# Patient Record
(demographics unavailable — no encounter records)

---

## 2025-03-04 NOTE — HISTORY OF PRESENT ILLNESS
[FreeTextEntry1] : 5/16/22: Ms. Brandi George is here today for neurology evaluation. She says that a few months ago she noted episodes of shaking. This only happened when lying in bed on her left side. She states that the shaking was strong enough to shake the bed. She was alert the entire time. She states the shaking lasted less than one minute. There was no residual symptoms. This happened on a few occasions over a two week period. she started to sleep on her right side. Since that time she has slept on her left side again and it has not recurred.   She also reports chronic headaches since the age of four years old. She has been diagnosed with migraines in the past. She has daily headaches and chronic eye pain.  Pain can occur in various locations - sometimes a pressure, sometimes a band around her head.  She has photophobia in general.  She has chronic nausea.  She has had change in her vision over the years but not associated with headaches.   Sleep is disrupted by environmental factors -  watching television.  She has not taken daily medications for headaches. She reports having multiple sensitivities. She had a bad reaction to venlafaxine in the past.  She has tingling in her left arm affecting all of her fingers. She has chronic neck pain.   3/28/24: She reports daily headaches that are holocephalic.  She continues to have pain in the back of her head.  She went to the ED on 3/21/24. The prior night she felt pressure over her temples, left more than right. She also felt tingling over the left side of her face. She had twitching around her left eye. She also noted an indentation/lump on the left side of her head and saw a prominent vein on the left side of her forehead. Her mother had temporal arteritis which caused her to be very concerned.  Her ESR was 28. CT head was unremarkable.  She continues to have some pain and pressure over her temples although it is not as bad. She still has some very slight numbness over the left face.   Sometimes she feels like her head hurts more with exertion.   She reports chronic pins and needles in her fingers and toes.   She does feel anxious all of the time.  She took venlafaxine in her 20s. She says that this triggered an autoimmune reaction.   3/4/25: She has a lot of pain and pressure at the back of her head, base of her skull which makes it difficult to sleep. Pain and pressure is present all of the time but is worse when she lies down. She also has pressure in her eyes.  If she tries to lie on her side her hands go numb. She did not try duloxetine. She was diagnosed with rheumatoid arthritis over the past year. She also says that Raynaud's has been bad. 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 She was also told that she has anti-phospholipid syndrome. Dr. Miranda told her that she has some carotid plaque. She notes some difficulty swallowing solids. She continues to have numbness in her feet. She has not had more shaking spells.

## 2025-03-04 NOTE — PHYSICAL EXAM
[FreeTextEntry1] : Examination: Constitutional: normal, no apparent distress Eyes: normal conjunctiva b/l, no ptosis, visual fields full Respiratory: no respiratory distress, normal effort, normal auscultation Cardiovascular: normal rate, rhythm, no murmurs Neck: supple, no masses Vascular: carotids normal Skin: normal color, no rashes Psych: normal mood, affect  Neurological: Memory: normal memory, oriented to person, place, time Language intact/no aphasia Cranial Nerves: II-XII intact, Pupils equally round and reactive to light, ocular muscles/movements intact, no ptosis, no facial weakness, tongue protrudes normally in the midline,  Motor: normal tone, no pronator drift, full strength in all four extremities in the proximal and distal muscle groups Coordination: Fine motor movements intact, rapid alternating movements intact, finger to nose intact bilaterally Sensory: intact to light touch DTRs: symmetric, hypoactive Gait: using cane

## 2025-03-04 NOTE — CONSULT LETTER
[Dear  ___] : Dear  [unfilled], [Consult Letter:] : I had the pleasure of evaluating your patient, [unfilled]. [Please see my note below.] : Please see my note below. [Consult Closing:] : Thank you very much for allowing me to participate in the care of this patient.  If you have any questions, please do not hesitate to contact me. [FreeTextEntry2] : Vasquez Solis [FreeTextEntry3] : Sincerely,   Lauren Farfan MD Diplomate, [employer] Neu[employer] Board Certified in the Subspecialty of Clinical Neurophysiology Board Certified in the Subspecialty of Sleep Medicine Board Certified in the Subspecialty of Epilepsy

## 2025-03-04 NOTE — DISCUSSION/SUMMARY
[FreeTextEntry1] :     Ms. Brandi George is a 48 year old woman with chronic headaches, episodes of shaking, chronic neck pain and left arm paresthesias.  She recently had a severe headache with numbness/tingling of the left side of her face. -I think this was likely a migraine with paresthesias in the face. -She also has chronic sinus congestion. -Will give samples of Nurtec to try in the event of another migraine. She should not take more than one in a 24 hour period.   Chronic headaches -She has daily headaches which are likely multifactorial. -MRI brain in November 2022 showed borderline Chiari. -She also has tension headaches, fibromyalgia and was recently diagnosed with rheumatoid arthritis -Will repeat MRI brain -She has been hesitant to try a daily medication in the past and would like to hold off for now. -If she changes her mind about meds, can consider gabapentin.     Chronic Neck Pain -NCV/EMG in September 2022 did not show evidence of acute radiculopathy -MRI November 2022 showed low-lying cerebellar tonsils and a small right paracentral disc protrusion at C5-C6. There was no central or foraminal narrowing. -f/u with rheumatology  Carpal Tunnel EMG in September 2022 showed mild CTS on the right and borderline CTS on the left. Can use wrist braces at bedtime  Paresthesias -Check vitamin B12, vitamin B6, SPEP -Family history of neuropathy (Father has CMT). She does not have hammer toes or high arches. She would like hold off on EMG/NCV for now.  Episode of shaking -Positional nature makes seizure less likely. -Episodes have stopped since she stooped sleeping on the left side. -Routine EEG was normal.  f/u after MRI, labs

## 2025-03-04 NOTE — DATA REVIEWED
[de-identified] : MRI brain 11/2/22:  No acute intracranial hemorrhage or acute infarct. Borderline Chiari I malformation  MRI cervical spine 11/2/22:  Low-lying cerebellar tonsils as can be seen with Chiari malformation. Small right paracentral disc protrusion at C5-6.  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   3/21/24: ESR 28  CT head 3/21/24: unremarkable.

## 2025-03-04 NOTE — DATA REVIEWED
[de-identified] : MRI brain 11/2/22:  No acute intracranial hemorrhage or acute infarct. Borderline Chiari I malformation  MRI cervical spine 11/2/22:  Low-lying cerebellar tonsils as can be seen with Chiari malformation. Small right paracentral disc protrusion at C5-6.  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   3/21/24: ESR 28  CT head 3/21/24: unremarkable.